# Patient Record
Sex: MALE | Race: OTHER | NOT HISPANIC OR LATINO | URBAN - METROPOLITAN AREA
[De-identification: names, ages, dates, MRNs, and addresses within clinical notes are randomized per-mention and may not be internally consistent; named-entity substitution may affect disease eponyms.]

---

## 2024-01-13 ENCOUNTER — EMERGENCY (EMERGENCY)
Facility: HOSPITAL | Age: 44
LOS: 1 days | Discharge: HOME | End: 2024-01-13
Attending: EMERGENCY MEDICINE
Payer: COMMERCIAL

## 2024-01-13 VITALS
OXYGEN SATURATION: 96 % | WEIGHT: 261.91 LBS | HEART RATE: 96 BPM | SYSTOLIC BLOOD PRESSURE: 122 MMHG | HEIGHT: 70 IN | TEMPERATURE: 98 F | DIASTOLIC BLOOD PRESSURE: 84 MMHG | RESPIRATION RATE: 20 BRPM

## 2024-01-13 LAB
FLUAV AG NPH QL: DETECTED
FLUAV AG NPH QL: DETECTED
FLUBV AG NPH QL: SIGNIFICANT CHANGE UP
FLUBV AG NPH QL: SIGNIFICANT CHANGE UP
SARS-COV-2 RNA SPEC QL NAA+PROBE: SIGNIFICANT CHANGE UP
SARS-COV-2 RNA SPEC QL NAA+PROBE: SIGNIFICANT CHANGE UP

## 2024-01-13 PROCEDURE — 71046 X-RAY EXAM CHEST 2 VIEWS: CPT

## 2024-01-13 PROCEDURE — 87637 SARSCOV2&INF A&B&RSV AMP PRB: CPT

## 2024-01-13 PROCEDURE — 71046 X-RAY EXAM CHEST 2 VIEWS: CPT | Mod: 26

## 2024-01-13 PROCEDURE — 99284 EMERGENCY DEPT VISIT MOD MDM: CPT

## 2024-01-13 PROCEDURE — 99283 EMERGENCY DEPT VISIT LOW MDM: CPT | Mod: 25

## 2024-01-13 NOTE — ED PROVIDER NOTE - ATTENDING APP SHARED VISIT CONTRIBUTION OF CARE
43-year-old male no pertinent past medical history presenting to emergency department for fever, cough, congestion since Tuesday night.  Patient is a smoker, not vaccinated for flu and COVID.  Patient states he works at [employer] and needs a swab to be able to return to work.  Has been taking NyQuil Advil and Tylenol with relief in symptoms.  Denies chest pain, shortness of breath, abdominal pain, N/V/D, urinary symptoms, rash, headache, visual changes, other complaint. PE as above, patient very well-appearing, tolerating PO intake, no increased work of breathing, not hypoxic suspect viral syndrome.  Will obtain chest x-ray rule out infiltrate, patient declining analgesia at this time, reassess dispo accordingly 43-year-old male no pertinent past medical history presenting to emergency department for fever, cough, congestion since Tuesday night.  Patient is a smoker, not vaccinated for flu and COVID.  Patient states he works at [employer] and needs a swab to be able to return to work.  Has been taking NyQuil Advil and Tylenol with relief in symptoms.  Denies chest pain, shortness of breath, abdominal pain, N/V/D, urinary symptoms, rash, headache, visual changes, other complaint. PE as above, patient very well-appearing, tolerating PO intake, no increased work of breathing, not hypoxic suspect viral syndrome.  Will obtain chest x-ray rule out infiltrate, patient declining analgesia at this time, reassess dispo accordingly

## 2024-01-13 NOTE — ED PROVIDER NOTE - CLINICAL SUMMARY MEDICAL DECISION MAKING FREE TEXT BOX
43-year-old male no pertinent past medical history presenting to emergency department for fever, cough, congestion since Tuesday night.  Patient is a smoker, not vaccinated for flu and COVID.  Patient states he works at [employer] and needs a swab to be able to return to work.  Has been taking NyQuil Advil and Tylenol with relief in symptoms.  Denies chest pain, shortness of breath, abdominal pain, N/V/D, urinary symptoms, rash, headache, visual changes, other complaint. PE as above, patient very well-appearing, tolerating PO intake, no increased work of breathing, not hypoxic suspect viral syndrome.  Will obtain chest x-ray rule out infiltrate, patient declining analgesia at this time, reassess dispo accordingly 43-year-old male no pertinent past medical history presenting to emergency department for fever, cough, congestion since Tuesday night.  Patient is a smoker, not vaccinated for flu and COVID.  Patient states he works at [employer] and needs a swab to be able to return to work.  Has been taking NyQuil Advil and Tylenol with relief in symptoms.  Denies chest pain, shortness of breath, abdominal pain, N/V/D, urinary symptoms, rash, headache, visual changes, other complaint. PE as above, patient very well-appearing, tolerating PO intake, no increased work of breathing, not hypoxic suspect viral syndrome.  Will obtain chest x-ray rule out infiltrate, patient declining analgesia at this time, reassess dispo accordingly

## 2024-01-13 NOTE — ED PROVIDER NOTE - NSFOLLOWUPINSTRUCTIONS_ED_ALL_ED_FT
1) Follow up with your doctor as discussed  2) Return to the ED immediately for new or worsening symptoms like chest pain, shortness of breath, inability to eat or drink   3) Please continue to take any home medications as prescribed  4) Your test results from your ED visit were discussed with you prior to discharge  5) You were provided with a copy of your test results  6) You may take Tylenol and or Motrin for pain

## 2024-01-13 NOTE — ED ADULT NURSE NOTE - OBJECTIVE STATEMENT
pt  is  a 44 y/o  male with  c/o  cough/fever/cough  and  congestion. ambulatory  with  no  signs  of  any  distress  nor  discomfort.

## 2024-01-13 NOTE — ED ADULT NURSE NOTE - NSFALLUNIVINTERV_ED_ALL_ED
Bed/Stretcher in lowest position, wheels locked, appropriate side rails in place/Call bell, personal items and telephone in reach/Instruct patient to call for assistance before getting out of bed/chair/stretcher/Non-slip footwear applied when patient is off stretcher/[place] to call system/Physically safe environment - no spills, clutter or unnecessary equipment/Purposeful proactive rounding/Room/bathroom lighting operational, light cord in reach Bed/Stretcher in lowest position, wheels locked, appropriate side rails in place/Call bell, personal items and telephone in reach/Instruct patient to call for assistance before getting out of bed/chair/stretcher/Non-slip footwear applied when patient is off stretcher/[place] to call system/Physically safe environment - no spills, clutter or unnecessary equipment/Purposeful proactive rounding/Room/bathroom lighting operational, light cord in reach

## 2024-01-13 NOTE — ED PROVIDER NOTE - PATIENT PORTAL LINK FT
You can access the FollowMyHealth Patient Portal offered by [facility] by registering at the following website: [email or web address]. By joining [employer]’s FollowMyHealth portal, you will also be able to view your health information using other applications (apps) compatible with our system. You can access the FollowMyHealth Patient Portal offered by [facility] by registering at the following website: [email or web address]. By joining [employer]’s FollowMyHealth portal, you will also be able to view your health information using other applications (apps) compatible with our system.

## 2024-01-13 NOTE — ED PROVIDER NOTE - CONDITION AT DISCHARGE:
Postop visit via telephone with patient.  He reports he is just finishing up his oral antibiotics.  He has firmness around the umbilical incision but no redness or drainage.  He also has occasional difficulty with bowel movements.  We discussed scar tissue formation and as long as his umbilical incision is not draining or red he should just keep an eye on it.  As for the difficulty with bowel movements, does a fluid collection in his pelvis which may be impinging on his rectum.  I advised him that this should absorb slowly over time and improve his bowel function.  Again, if he has difficulties or additional problems he is welcome to call or come in for a visit.  Patient understood.    Andrew Prajapati MD   
Satisfactory

## 2024-01-13 NOTE — ED PROVIDER NOTE - OBJECTIVE STATEMENT
43-year-old male no pertinent past medical history presenting to emergency department for fever, cough, congestion since Tuesday night.  Patient is a smoker, not vaccinated for flu and COVID.  Patient states he works at [employer] and needs a swab to be able to return to work.  Has been taking NyQuil Advil and Tylenol with relief in symptoms.  Denies chest pain, shortness of breath, abdominal pain, N/V/D, urinary symptoms, rash, headache, visual changes, other complaint 43-year-old male no pertinent past medical history presenting to emergency department for fever, cough, congestion since Tuesday night.  Patient is a smoker, not vaccinated for flu and COVID.  Patient states he works at [employer] and needs a swab to be able to return to work.  Has been taking NyQuil Advil and Tylenol with relief in symptoms.  Denies chest pain, shortness of breath, abdominal pain, N/V/D, urinary symptoms, rash, headache, visual changes, other complaint

## 2024-01-13 NOTE — ED PROVIDER NOTE - NSCAREINITIATED _GEN_ER
Colletta, Morgan(NP)
[FreeTextEntry1] : Well visit and follow up for management of:\par Hyperlipidemia - on meds\par GERD - on meds\par Low Vit D  -on meds\par ED  -on meds

## 2024-01-31 ENCOUNTER — APPOINTMENT (OUTPATIENT)
Dept: INTERNAL MEDICINE | Facility: CLINIC | Age: 44
End: 2024-01-31
Payer: COMMERCIAL

## 2024-01-31 VITALS
RESPIRATION RATE: 16 BRPM | WEIGHT: 265 LBS | HEART RATE: 75 BPM | OXYGEN SATURATION: 98 % | DIASTOLIC BLOOD PRESSURE: 89 MMHG | SYSTOLIC BLOOD PRESSURE: 132 MMHG

## 2024-01-31 DIAGNOSIS — Z83.3 FAMILY HISTORY OF DIABETES MELLITUS: ICD-10-CM

## 2024-01-31 DIAGNOSIS — Z00.00 ENCOUNTER FOR GENERAL ADULT MEDICAL EXAMINATION W/OUT ABNORMAL FINDINGS: ICD-10-CM

## 2024-01-31 DIAGNOSIS — K63.5 POLYP OF COLON: ICD-10-CM

## 2024-01-31 DIAGNOSIS — Z83.438 FAMILY HISTORY OF OTHER DISORDER OF LIPOPROTEIN METABOLISM AND OTHER LIPIDEMIA: ICD-10-CM

## 2024-01-31 DIAGNOSIS — F17.200 NICOTINE DEPENDENCE, UNSPECIFIED, UNCOMPLICATED: ICD-10-CM

## 2024-01-31 PROCEDURE — 99386 PREV VISIT NEW AGE 40-64: CPT

## 2024-01-31 NOTE — PHYSICAL EXAM
[No Acute Distress] : no acute distress [Normal Voice/Communication] : normal voice/communication [Normal Sclera/Conjunctiva] : normal sclera/conjunctiva [No Edema] : there was no peripheral edema [Normal] : no rash

## 2024-01-31 NOTE — HEALTH RISK ASSESSMENT
Psychoeducation Group Documentation    PATIENT'S NAME: Carroll Duke  MRN:   [id number]  :   2001  ACCT. NUMBER: [id number]  DATE OF SERVICE: 21  START TIME:  9:30 AM  END TIME: 10:30 AM  FACILITATOR(S): Emily Nails; Julissa Johansen [facility]  TOPIC: BEH Pyschoeducation  Number of patients attending the group:  2  Group Length:  1 Hours    Dimensions addressed 3, 4, 5, and 6    Summary of Group / Topics Discussed:    Psycho-education: Mindfulness Walk: Residents were given a brief review on mindfulness before putting it into practice. Residents were asked to define mindfulness and describe different mindfulness practices to try. Residents put mindfulness into practice by going for a mindfulness walk in the park. After the walk, residents were asked to utilize a DBT skill where they identified different aspects that soothed them through their 5 senses.        Goals/Objectives  - Resident will be able to define mindfulness and put it into practice.  - Resident will identify different things that assist in soothing them.   - Resident will use 5 senses to help identify those aspects.             Group Attendance:  Attended group session    Patient's response to the group topic/interactions:  cooperative with task, gave appropriate feedback to peers, listened actively and offered helpful suggestions to peers    Patient appeared to be Actively participating, Attentive and Engaged.         Client specific details:  Resident walked around the loop and engaged in appropriate conversation with staff and peers. Resident rated his anxiety at a 3 before the walk. Resident stated that he really enjoyed feeling the warm sun on his face. He was able to identify many things that were occurring around him. After the walk we sat down and used the self-soothe DBT skill, where you use your senses to identify things that make you feel calmer. Resident came up with several ideas and often referred to items that were  [Good] : ~his/her~  mood as  good related to his family. After the walk he rated his anxiety at a 0.     Was unable to bill due to only have 2 residents at the time.     Emily Nails, Intern     [Yes] : Yes [Monthly or less (1 pt)] : Monthly or less (1 point) [Little interest or pleasure doing things] : 1) Little interest or pleasure doing things [Feeling down, depressed, or hopeless] : 2) Feeling down, depressed, or hopeless [0] : 2) Feeling down, depressed, or hopeless: Not at all (0) [PHQ-2 Negative - No further assessment needed] : PHQ-2 Negative - No further assessment needed [de-identified] : no [de-identified] : regular  [[id number]] : 0 [Change in mental status noted] : No change in mental status noted [None] : None [With Family] : lives with family [Employed] : employed [Single] : single [Reports changes in hearing] : Reports no changes in hearing [Reports changes in vision] : Reports no changes in vision [Reports changes in dental health] : Reports no changes in dental health [Smoke Detector] : smoke detector [Carbon Monoxide Detector] : carbon monoxide detector [Safety elements used in home] : safety elements used in home [Seat Belt] :  uses seat belt [ColonoscopyDate] : 2021 [ColonoscopyComments] : polyp

## 2024-02-01 ENCOUNTER — NON-APPOINTMENT (OUTPATIENT)
Age: 44
End: 2024-02-01

## 2024-02-01 LAB
ALBUMIN SERPL ELPH-MCNC: 4.8 G/DL
ALP BLD-CCNC: 114 U/L
ALT SERPL-CCNC: 30 U/L
ANION GAP SERPL CALC-SCNC: 11 MMOL/L
AST SERPL-CCNC: 68 U/L
BILIRUB SERPL-MCNC: 0.3 MG/DL
BUN SERPL-MCNC: 11 MG/DL
CALCIUM SERPL-MCNC: 9.8 MG/DL
CHLORIDE SERPL-SCNC: 103 MMOL/L
CHOLEST SERPL-MCNC: 151 MG/DL
CO2 SERPL-SCNC: 26 MMOL/L
CREAT SERPL-MCNC: 0.69 MG/DL
EGFR: 119 ML/MIN/1.73M2
ESTIMATED AVERAGE GLUCOSE: 128 MG/DL
GLUCOSE SERPL-MCNC: 92 MG/DL
HBA1C MFR BLD HPLC: 6.1 %
HCT VFR BLD CALC: 43.7 %
HDLC SERPL-MCNC: 42 MG/DL
HGB BLD-MCNC: 14.4 G/DL
LDLC SERPL CALC-MCNC: 80 MG/DL
MCHC RBC-ENTMCNC: 28.7 PG
MCHC RBC-ENTMCNC: 33 GM/DL
MCV RBC AUTO: 87.2 FL
NONHDLC SERPL-MCNC: 108 MG/DL
PLATELET # BLD AUTO: 227 K/UL
POTASSIUM SERPL-SCNC: 4.1 MMOL/L
PROT SERPL-MCNC: 7.3 G/DL
RBC # BLD: 5.01 M/UL
RBC # FLD: 14.2 %
SODIUM SERPL-SCNC: 140 MMOL/L
TRIGL SERPL-MCNC: 168 MG/DL
TSH SERPL-ACNC: 1.59 UIU/ML
WBC # FLD AUTO: 8.37 K/UL

## 2024-02-02 DIAGNOSIS — J35.1 HYPERTROPHY OF TONSILS: ICD-10-CM

## 2024-02-05 ENCOUNTER — TRANSCRIPTION ENCOUNTER (OUTPATIENT)
Age: 44
End: 2024-02-05

## 2024-02-21 ENCOUNTER — APPOINTMENT (OUTPATIENT)
Dept: GASTROENTEROLOGY | Facility: CLINIC | Age: 44
End: 2024-02-21
Payer: COMMERCIAL

## 2024-02-21 VITALS
DIASTOLIC BLOOD PRESSURE: 82 MMHG | HEIGHT: 70 IN | WEIGHT: 256 LBS | HEART RATE: 79 BPM | SYSTOLIC BLOOD PRESSURE: 140 MMHG | BODY MASS INDEX: 36.65 KG/M2

## 2024-02-21 DIAGNOSIS — Z14.8 GENETIC CARRIER OF OTHER DISEASE: ICD-10-CM

## 2024-02-21 DIAGNOSIS — Z80.0 FAMILY HISTORY OF MALIGNANT NEOPLASM OF DIGESTIVE ORGANS: ICD-10-CM

## 2024-02-21 PROCEDURE — 99204 OFFICE O/P NEW MOD 45 MIN: CPT

## 2024-02-21 RX ORDER — POLYETHYLENE GLYCOL 3350 AND ELECTROLYTES WITH LEMON FLAVOR 236; 22.74; 6.74; 5.86; 2.97 G/4L; G/4L; G/4L; G/4L; G/4L
236 POWDER, FOR SOLUTION ORAL
Qty: 1 | Refills: 0 | Status: ACTIVE | COMMUNITY
Start: 2024-02-21 | End: 1900-01-01

## 2024-02-21 NOTE — PHYSICAL EXAM
[Alert] : alert [No Acute Distress] : no acute distress [Sclera] : the sclera and conjunctiva were normal [Hearing Threshold Finger Rub Not [place]] : hearing was normal [Normal Appearance] : the appearance of the neck was normal [No Respiratory Distress] : no respiratory distress [Auscultation Breath Sounds / Voice Sounds] : lungs were clear to auscultation bilaterally [Heart Rate And Rhythm] : heart rate was normal and rhythm regular [Normal S1, S2] : normal S1 and S2 [None] : no edema [Bowel Sounds] : normal bowel sounds [Abdomen Tenderness] : non-tender [No Masses] : no abdominal mass palpated [Abdomen Soft] : soft [Supraclavicular Lymph Nodes Enlarged Bilaterally] : no supraclavicular lymphadenopathy [Cervical Lymph Nodes Enlarged Anterior Bilaterally] : no anterior cervical lymphadenopathy [No CVA Tenderness] : no CVA  tenderness [Abnormal Walk] : normal gait [Normal Color / Pigmentation] : normal skin color and pigmentation [No Focal Deficits] : no focal deficits [Oriented To Time, Place, And Person] : oriented to person, place, and time

## 2024-02-26 NOTE — ASSESSMENT
[FreeTextEntry1] : 1.  Mcmahan syndrome (MLH1) surveillance.   Family history of Mcmahan syndrome (father).  Prior colonoscopy and EGD 2-3 years ago.  2.  Family history of gastric and colon cancer (father), stomach cancer (paternal uncle), ovarian cancer (paternal aunt). 3.  Obesity.  Recs: - Recent labs reviewed. - Genetic test results reviewed on phone. - Patient was advised to undergo endoscopy and colonoscopy- procedure, risks, benefits, and alternatives were explained. Patient agreeable. Brochure given. Golytely prep.

## 2024-02-26 NOTE — HISTORY OF PRESENT ILLNESS
[FreeTextEntry1] : Kamran presents to the office today to establish care for Mcmahan syndrome (MLH1).  The patient was diagnosed with Mcmahan syndrome with an MLH1 mutation in 2017 after his father  from stomach cancer complications.  His father may have also had colon cancer.  His paternal uncle also had stomach cancer and a paternal aunt was treated for ovarian cancer.  After his diagnosis, the patient underwent several EGDs and colonoscopies.  2 polyps were removed on his last colonoscopy 2-3 years ago.  The patient currently feels stable from a GI perspective and denies any upper GI complaints.  He moves his bowels daily without any bleeding.  His weight is stable.  He works as a  overnight  at [employer].

## 2024-04-02 ENCOUNTER — APPOINTMENT (OUTPATIENT)
Dept: GASTROENTEROLOGY | Facility: CLINIC | Age: 44
End: 2024-04-02
Payer: COMMERCIAL

## 2024-04-02 ENCOUNTER — LABORATORY RESULT (OUTPATIENT)
Age: 44
End: 2024-04-02

## 2024-04-02 DIAGNOSIS — G47.33 OBSTRUCTIVE SLEEP APNEA (ADULT) (PEDIATRIC): ICD-10-CM

## 2024-04-02 PROCEDURE — 45380 COLONOSCOPY AND BIOPSY: CPT

## 2024-04-17 ENCOUNTER — NON-APPOINTMENT (OUTPATIENT)
Age: 44
End: 2024-04-17

## 2024-09-16 ENCOUNTER — APPOINTMENT (OUTPATIENT)
Dept: GASTROENTEROLOGY | Facility: HOSPITAL | Age: 44
End: 2024-09-16

## 2024-12-05 ENCOUNTER — NON-APPOINTMENT (OUTPATIENT)
Age: 44
End: 2024-12-05

## 2025-03-26 ENCOUNTER — NON-APPOINTMENT (OUTPATIENT)
Age: 45
End: 2025-03-26